# Patient Record
Sex: FEMALE | Race: WHITE | NOT HISPANIC OR LATINO | Employment: UNEMPLOYED | URBAN - METROPOLITAN AREA
[De-identification: names, ages, dates, MRNs, and addresses within clinical notes are randomized per-mention and may not be internally consistent; named-entity substitution may affect disease eponyms.]

---

## 2021-09-30 LAB
HPV 16: NEGATIVE
HPV 18: NEGATIVE
HPV, HIGH-RISK: POSITIVE
PAP RECOMMENDATION EXT: ABNORMAL

## 2022-02-17 LAB — PAP RECOMMENDATION EXT: NORMAL

## 2022-07-22 ENCOUNTER — PATIENT OUTREACH (OUTPATIENT)
Dept: ADMINISTRATIVE | Facility: HOSPITAL | Age: 32
End: 2022-07-22

## 2022-07-22 PROBLEM — F32.A DEPRESSION: Status: ACTIVE | Noted: 2022-07-22

## 2022-07-22 PROBLEM — K21.9 GASTROESOPHAGEAL REFLUX DISEASE WITHOUT ESOPHAGITIS: Status: ACTIVE | Noted: 2022-07-22

## 2022-07-22 PROBLEM — F41.9 ANXIETY: Status: ACTIVE | Noted: 2022-07-22

## 2022-07-22 PROBLEM — R10.11 RUQ ABDOMINAL PAIN: Status: ACTIVE | Noted: 2022-07-22

## 2022-07-22 PROBLEM — K21.9 GASTROESOPHAGEAL REFLUX DISEASE WITHOUT ESOPHAGITIS: Chronic | Status: ACTIVE | Noted: 2022-07-22

## 2022-07-22 PROBLEM — F32.A DEPRESSION: Chronic | Status: ACTIVE | Noted: 2022-07-22

## 2022-07-22 NOTE — LETTER
AUTHORIZATION FOR RELEASE OF   CONFIDENTIAL INFORMATION    Dear Ivana Adair MD [facility] Medical Records,    We are seeing Melanie Mclain, date of birth 1990, in the clinic at [facility]. Sidney Holloway MD is the patient's PCP. Melanie Mclain has an outstanding lab/procedure at the time we reviewed her chart. In order to help keep her health information updated, she has authorized us to request the following medical record(s):        (  )  MAMMOGRAM                                      (  )  COLONOSCOPY      ( X )  PAP SMEAR      report                       (  )  OUTSIDE LAB RESULTS     (  )  DEXA SCAN                                          (  )  DM EYE EXAM            (  )  FOOT EXAM                                          (  )  ENTIRE RECORD     (  )  OUTSIDE IMMUNIZATIONS                 (  )  _______________        Please fax records to [facility],  [phone]       If you have any questions, please contact Gala at [phone].           Patient Name: Melanie Mclain  : 1990  Patient Phone #: [phone]

## 2022-07-22 NOTE — PROGRESS NOTES
Health Maintenance Due   Topic Date Due    Hepatitis C Screening  Never done    Cervical Cancer Screening  Never done    HIV Screening  Never done    COVID-19 Vaccine (3 - Booster for Pfizer series) 10/19/2021   faxed ASHLEIGH to [facility] for complete PAP [phone].

## 2022-08-03 PROBLEM — N39.0 UTI (URINARY TRACT INFECTION): Status: ACTIVE | Noted: 2022-08-03

## 2022-08-03 PROBLEM — N20.1 LEFT URETERAL STONE: Status: ACTIVE | Noted: 2022-08-03

## 2022-08-26 PROBLEM — N20.1 LEFT URETERAL STONE: Status: RESOLVED | Noted: 2022-08-03 | Resolved: 2022-08-26

## 2022-09-07 PROBLEM — N20.0 RENAL STONE: Status: ACTIVE | Noted: 2022-09-07

## 2022-09-07 PROBLEM — N20.0 RENAL STONE: Status: RESOLVED | Noted: 2022-09-07 | Resolved: 2022-09-07

## 2022-09-20 ENCOUNTER — PATIENT OUTREACH (OUTPATIENT)
Dept: ADMINISTRATIVE | Facility: HOSPITAL | Age: 32
End: 2022-09-20

## 2023-04-28 ENCOUNTER — PATIENT OUTREACH (OUTPATIENT)
Dept: ADMINISTRATIVE | Facility: HOSPITAL | Age: 33
End: 2023-04-28

## 2023-07-31 PROBLEM — F90.0 ATTENTION DEFICIT HYPERACTIVITY DISORDER (ADHD), PREDOMINANTLY INATTENTIVE TYPE: Status: ACTIVE | Noted: 2023-07-31

## 2023-07-31 PROBLEM — F33.0 MILD EPISODE OF RECURRENT MAJOR DEPRESSIVE DISORDER: Status: ACTIVE | Noted: 2022-07-22

## 2024-07-17 ENCOUNTER — PATIENT MESSAGE (OUTPATIENT)
Dept: ADMINISTRATIVE | Facility: OTHER | Age: 34
End: 2024-07-17

## 2024-07-17 PROBLEM — F43.10 PTSD (POST-TRAUMATIC STRESS DISORDER): Status: ACTIVE | Noted: 2024-07-17

## 2024-09-23 ENCOUNTER — NURSE TRIAGE (OUTPATIENT)
Dept: ADMINISTRATIVE | Facility: CLINIC | Age: 34
End: 2024-09-23

## 2024-09-23 PROBLEM — F41.9 ANXIETY DURING PREGNANCY: Status: RESOLVED | Noted: 2022-02-17 | Resolved: 2024-09-23

## 2024-09-23 PROBLEM — O99.340 ANXIETY DURING PREGNANCY: Status: RESOLVED | Noted: 2022-02-17 | Resolved: 2024-09-23

## 2024-09-23 PROBLEM — R10.11 RUQ ABDOMINAL PAIN: Status: RESOLVED | Noted: 2022-07-22 | Resolved: 2024-09-23

## 2024-09-23 PROBLEM — N39.0 UTI (URINARY TRACT INFECTION): Status: RESOLVED | Noted: 2022-08-03 | Resolved: 2024-09-23

## 2024-09-23 PROBLEM — Z86.16 PERSONAL HISTORY OF COVID-19: Status: RESOLVED | Noted: 2022-01-20 | Resolved: 2024-09-23

## 2024-09-23 PROBLEM — N20.1 URETERAL STONE: Status: RESOLVED | Noted: 2022-08-03 | Resolved: 2024-09-23

## 2024-09-23 PROBLEM — O13.3 GESTATIONAL HYPERTENSION, THIRD TRIMESTER: Status: RESOLVED | Noted: 2022-03-24 | Resolved: 2024-09-23

## 2024-09-23 PROBLEM — E66.01 CLASS 3 SEVERE OBESITY DUE TO EXCESS CALORIES WITHOUT SERIOUS COMORBIDITY WITH BODY MASS INDEX (BMI) OF 50.0 TO 59.9 IN ADULT: Status: ACTIVE | Noted: 2022-03-04

## 2024-09-23 NOTE — TELEPHONE ENCOUNTER
Right toe pain for >1 week, thought she had an ingrown toenail or fungal infection, seen at [facility] 2 days ago and started on antibiotics oral and topical cream, states its not improving and is worse.   Care advice states to be seen in the office today.  No availability with Dr. Karley Arguello, appointment made for today at 2pm with Dr. MUNIR Rosa, [facility] Internal Medicine.  Patient verbally understands, all questions answered, advised to call back for any worsening symptoms or further needs.     Reason for Disposition   Looks like a boil, infected sore, deep ulcer, or other infected rash (spreading redness, pus)    Additional Information   Negative: Purple or black skin on toe  (Exception: Person remembers bruising the toe from an injury.)   Negative: Patient sounds very sick or weak to the triager   Negative: Foot is cool or blue in comparison to other foot   Negative: SEVERE pain (e.g., excruciating, unable to do any normal activities) and not improved after 2 hours of pain medicine    Protocols used: Toe Pain-A-OH